# Patient Record
Sex: FEMALE | Race: WHITE | NOT HISPANIC OR LATINO
[De-identification: names, ages, dates, MRNs, and addresses within clinical notes are randomized per-mention and may not be internally consistent; named-entity substitution may affect disease eponyms.]

---

## 2017-09-01 ENCOUNTER — TRANSCRIPTION ENCOUNTER (OUTPATIENT)
Age: 62
End: 2017-09-01

## 2017-09-05 ENCOUNTER — TRANSCRIPTION ENCOUNTER (OUTPATIENT)
Age: 62
End: 2017-09-05

## 2017-11-02 ENCOUNTER — APPOINTMENT (OUTPATIENT)
Dept: ENDOCRINOLOGY | Facility: CLINIC | Age: 62
End: 2017-11-02
Payer: COMMERCIAL

## 2017-11-02 VITALS — OXYGEN SATURATION: 98 % | HEART RATE: 72 BPM | DIASTOLIC BLOOD PRESSURE: 82 MMHG | SYSTOLIC BLOOD PRESSURE: 104 MMHG

## 2017-11-02 VITALS — BODY MASS INDEX: 23.97 KG/M2 | HEIGHT: 63.25 IN | WEIGHT: 137 LBS

## 2017-11-02 DIAGNOSIS — Z87.39 PERSONAL HISTORY OF OTHER DISEASES OF THE MUSCULOSKELETAL SYSTEM AND CONNECTIVE TISSUE: ICD-10-CM

## 2017-11-02 PROCEDURE — ZZZZZ: CPT

## 2017-11-02 PROCEDURE — 77080 DXA BONE DENSITY AXIAL: CPT

## 2017-11-02 PROCEDURE — 99244 OFF/OP CNSLTJ NEW/EST MOD 40: CPT | Mod: 25

## 2017-11-08 RX ORDER — RISEDRONATE SODIUM 150 MG/1
150 TABLET, FILM COATED ORAL
Qty: 3 | Refills: 3 | Status: DISCONTINUED | COMMUNITY
Start: 2017-11-02 | End: 2017-11-08

## 2017-11-21 ENCOUNTER — TRANSCRIPTION ENCOUNTER (OUTPATIENT)
Age: 62
End: 2017-11-21

## 2017-12-06 ENCOUNTER — TRANSCRIPTION ENCOUNTER (OUTPATIENT)
Age: 62
End: 2017-12-06

## 2017-12-06 ENCOUNTER — RX RENEWAL (OUTPATIENT)
Age: 62
End: 2017-12-06

## 2017-12-07 ENCOUNTER — TRANSCRIPTION ENCOUNTER (OUTPATIENT)
Age: 62
End: 2017-12-07

## 2017-12-09 LAB
25(OH)D3 SERPL-MCNC: 49.1 NG/ML
ALBUMIN SERPL ELPH-MCNC: 4.8 G/DL
ALP BLD-CCNC: 75 U/L
ALT SERPL-CCNC: 20 U/L
ANION GAP SERPL CALC-SCNC: 15 MMOL/L
AST SERPL-CCNC: 21 U/L
BILIRUB SERPL-MCNC: 0.2 MG/DL
BUN SERPL-MCNC: 15 MG/DL
CALCIUM SERPL-MCNC: 10.2 MG/DL
CALCIUM SERPL-MCNC: 10.2 MG/DL
CHLORIDE SERPL-SCNC: 101 MMOL/L
CO2 SERPL-SCNC: 27 MMOL/L
CREAT SERPL-MCNC: 0.79 MG/DL
GLUCOSE SERPL-MCNC: 88 MG/DL
PARATHYROID HORMONE INTACT: 21 PG/ML
POTASSIUM SERPL-SCNC: 4.1 MMOL/L
PROT SERPL-MCNC: 7.3 G/DL
SODIUM SERPL-SCNC: 143 MMOL/L
TSH SERPL-ACNC: 2.3 UIU/ML

## 2018-05-14 ENCOUNTER — APPOINTMENT (OUTPATIENT)
Dept: ENDOCRINOLOGY | Facility: CLINIC | Age: 63
End: 2018-05-14
Payer: COMMERCIAL

## 2018-05-14 VITALS
HEIGHT: 63.25 IN | WEIGHT: 138 LBS | DIASTOLIC BLOOD PRESSURE: 80 MMHG | HEART RATE: 75 BPM | SYSTOLIC BLOOD PRESSURE: 116 MMHG | BODY MASS INDEX: 24.15 KG/M2 | OXYGEN SATURATION: 98 %

## 2018-05-14 PROCEDURE — 99214 OFFICE O/P EST MOD 30 MIN: CPT

## 2018-05-14 RX ORDER — ALENDRONATE SODIUM 70 MG/1
70 TABLET ORAL
Qty: 13 | Refills: 3 | Status: DISCONTINUED | COMMUNITY
Start: 2017-11-08 | End: 2018-05-14

## 2018-06-25 ENCOUNTER — TRANSCRIPTION ENCOUNTER (OUTPATIENT)
Age: 63
End: 2018-06-25

## 2018-06-26 ENCOUNTER — RESULT REVIEW (OUTPATIENT)
Age: 63
End: 2018-06-26

## 2018-06-26 ENCOUNTER — TRANSCRIPTION ENCOUNTER (OUTPATIENT)
Age: 63
End: 2018-06-26

## 2018-06-28 ENCOUNTER — TRANSCRIPTION ENCOUNTER (OUTPATIENT)
Age: 63
End: 2018-06-28

## 2018-07-26 ENCOUNTER — TRANSCRIPTION ENCOUNTER (OUTPATIENT)
Age: 63
End: 2018-07-26

## 2018-08-18 ENCOUNTER — APPOINTMENT (OUTPATIENT)
Dept: RHEUMATOLOGY | Facility: CLINIC | Age: 63
End: 2018-08-18
Payer: COMMERCIAL

## 2018-08-18 PROCEDURE — 96374 THER/PROPH/DIAG INJ IV PUSH: CPT

## 2018-11-28 ENCOUNTER — APPOINTMENT (OUTPATIENT)
Dept: ENDOCRINOLOGY | Facility: CLINIC | Age: 63
End: 2018-11-28
Payer: COMMERCIAL

## 2018-11-28 VITALS
HEIGHT: 63.25 IN | WEIGHT: 120 LBS | DIASTOLIC BLOOD PRESSURE: 70 MMHG | SYSTOLIC BLOOD PRESSURE: 104 MMHG | HEART RATE: 70 BPM | BODY MASS INDEX: 21 KG/M2 | OXYGEN SATURATION: 98 %

## 2018-11-28 PROCEDURE — 99214 OFFICE O/P EST MOD 30 MIN: CPT | Mod: 25

## 2018-11-28 PROCEDURE — 77080 DXA BONE DENSITY AXIAL: CPT

## 2018-11-28 PROCEDURE — ZZZZZ: CPT

## 2018-11-28 RX ORDER — RISEDRONATE SODIUM 150 MG/1
150 TABLET, FILM COATED ORAL
Qty: 3 | Refills: 3 | Status: DISCONTINUED | COMMUNITY
Start: 2017-12-06 | End: 2018-11-28

## 2018-11-28 RX ORDER — LOSARTAN POTASSIUM 25 MG/1
25 TABLET, FILM COATED ORAL
Refills: 0 | Status: ACTIVE | COMMUNITY

## 2018-11-29 LAB
25(OH)D3 SERPL-MCNC: 47.2 NG/ML
ALBUMIN SERPL ELPH-MCNC: 4.6 G/DL
ALP BLD-CCNC: 55 U/L
ALT SERPL-CCNC: 19 U/L
ANION GAP SERPL CALC-SCNC: 12 MMOL/L
AST SERPL-CCNC: 26 U/L
BILIRUB SERPL-MCNC: 0.4 MG/DL
BUN SERPL-MCNC: 14 MG/DL
CALCIUM SERPL-MCNC: 10.2 MG/DL
CHLORIDE SERPL-SCNC: 101 MMOL/L
CHOLEST SERPL-MCNC: 174 MG/DL
CHOLEST/HDLC SERPL: 2.6 RATIO
CO2 SERPL-SCNC: 28 MMOL/L
CREAT SERPL-MCNC: 1.04 MG/DL
GLUCOSE SERPL-MCNC: 83 MG/DL
HDLC SERPL-MCNC: 68 MG/DL
LDLC SERPL CALC-MCNC: 84 MG/DL
POTASSIUM SERPL-SCNC: 4.7 MMOL/L
PROT SERPL-MCNC: 7.5 G/DL
SODIUM SERPL-SCNC: 141 MMOL/L
TRIGL SERPL-MCNC: 108 MG/DL
TSH SERPL-ACNC: 2.71 UIU/ML

## 2018-11-29 RX ORDER — ALBUTEROL SULFATE 90 UG/1
108 (90 BASE) AEROSOL, METERED RESPIRATORY (INHALATION)
Qty: 9 | Refills: 0 | Status: COMPLETED | COMMUNITY
Start: 2018-08-20

## 2018-12-03 LAB — COLLAGEN CTX SERPL-MCNC: 96 PG/ML

## 2019-10-20 ENCOUNTER — TRANSCRIPTION ENCOUNTER (OUTPATIENT)
Age: 64
End: 2019-10-20

## 2020-01-14 ENCOUNTER — APPOINTMENT (OUTPATIENT)
Dept: ENDOCRINOLOGY | Facility: CLINIC | Age: 65
End: 2020-01-14
Payer: COMMERCIAL

## 2020-01-14 VITALS
WEIGHT: 118 LBS | SYSTOLIC BLOOD PRESSURE: 110 MMHG | HEIGHT: 63 IN | HEART RATE: 65 BPM | DIASTOLIC BLOOD PRESSURE: 64 MMHG | BODY MASS INDEX: 20.91 KG/M2 | OXYGEN SATURATION: 98 %

## 2020-01-14 PROCEDURE — 77080 DXA BONE DENSITY AXIAL: CPT

## 2020-01-14 PROCEDURE — 99214 OFFICE O/P EST MOD 30 MIN: CPT | Mod: 25

## 2020-01-14 PROCEDURE — ZZZZZ: CPT

## 2020-01-14 NOTE — PROCEDURE
[FreeTextEntry1] : Bone mineral density: 01/14/2020 \par Indication: vs 2018.\par Spine: (L1 - L2) -2.2, osteopenia, -3.3%\par Total hip: -2.5, osteoporosis, -5.8%\par Femoral neck: -2.9, osteoporosis, no significant change \par Proximal radius: -1.5, osteopenia, -7.5%\par \par Bone mineral density: 11/28/2018 \par Indication: vs. 2017 prior test showed bone loss, assess response to medication \par Spine: (L1-L2) -2.0 osteopenia, no significant change \par Total hip: -2.2 osteopenia, no significant change \par Femoral neck: -2.9 osteoporosis (-6.1%, but troch stable)\par Proximal radius: -0.6 normal, no significant change \par \par Bone mineral density November 4, 2017\par indication:Compared to 2013\par spine L1-2 -2.0 osteopenia -3.3% versus 2013 -10.3% versus 2010\par total hip -2.2, osteopenia, -4.0%, -10.9% versus 2010\par femoral neck - 2.6, osteoporosis, -5.1%; -8.6% versus 2010\par proximal radius -1.2   osteopenia \par \par bone mineral density test performed December 26, 2013\par Spine L1-L2 -1.8, osteopenia -7.2% versus 2010\par total hip -2.0, osteopenia, -7.3% versus 2010\par Femoral neck -2.3, osteopenia, no significant change versus 2010\par proximal radius -1.0, normal

## 2020-01-14 NOTE — HISTORY OF PRESENT ILLNESS
[Risedronate (Actonel)] : Risedronate [Calcium (dietary)] : calcium from their regular diet [Vitamin D (supplements)] : Vitamin D as a dietary supplement [Patient taking Meds Correctly] : Patient is taking meds correctly [Zoledronic Acid (Zometa)] : Zoledronic Acid [Family History of Osteoporosis] : family history of osteoporosis [Regular Dental Follow-Up] : regular dental follow-up [Previous Fragility Fracture] : previous fragility fracture(s) [FreeTextEntry1] : F/u for 64 year-old female with osteoporosis.\par \par H/o traumatic tibial plateau fx; wrist fx, fell over dog. Pt had low bone density after menopause consistent with osteopenia which decreased from 2010 to 2013 but she chose to observe off therapy. BMD 2017 showed significant decrease in the spine and hip making the fem neck consistent with osteoporosis. Began Actonel 11/2017 pt had some mild UGI sx. Switched to IV Reclast, first dose ~July 2018. Tolerated well, except for minor breast rash for 3 days. No interval fx. Last DDS within 6 mons. No ONJ. No thigh pain. \par \par FHx is notable for idiopathic osteoporosis in her son. Her daughter dx with papillary thyroid CA. [Disordered Eating] : no past or present history of disordered eating [Amenorrhea] : no past or present history of amenorrhea [Taking Steroids] : no past or present history of taking steroids [Kidney Stones] : no history of kidney stones [Family History of Breast Cancer] : no family history of breast cancer [Hyperparathyroidism] : no hyperparathyroidism [Family History of Hip Fracture] : no family history of hip fracture [History of Radiation Therapy] : no history of radiation therapy [History of Blood Clots] : no history of blood clots

## 2020-01-14 NOTE — PHYSICAL EXAM
[Alert] : alert [Well Developed] : well developed [No Acute Distress] : no acute distress [Well Nourished] : well nourished [Normal Sclera/Conjunctiva] : normal sclera/conjunctiva [No Proptosis] : no proptosis [Normal Oropharynx] : the oropharynx was normal [Thyroid Not Enlarged] : the thyroid was not enlarged [No Thyroid Nodules] : there were no palpable thyroid nodules [No Respiratory Distress] : no respiratory distress [No Accessory Muscle Use] : no accessory muscle use [Clear to Auscultation] : lungs were clear to auscultation bilaterally [Normal Rate] : heart rate was normal  [Normal S1, S2] : normal S1 and S2 [Regular Rhythm] : with a regular rhythm [Normal Bowel Sounds] : normal bowel sounds [Not Tender] : non-tender [Soft] : abdomen soft [Anterior Cervical Nodes] : anterior cervical nodes [Not Distended] : not distended [Normal] : normal and non tender [No Spinal Tenderness] : no spinal tenderness [Spine Straight] : spine straight [Normal Gait] : normal gait [No Stigmata of Cushings Syndrome] : no stigmata of cushings syndrome [No Rash] : no rash [Normal Strength/Tone] : muscle strength and tone were normal [Normal Reflexes] : deep tendon reflexes were 2+ and symmetric [No Tremors] : no tremors [Oriented x3] : oriented to person, place, and time

## 2020-01-14 NOTE — END OF VISIT
[FreeTextEntry3] : I, Everton Espino, authored this note working as a medical scribe for Dr. Patiño.  01/14/2020.  4:30PM.  This note was authored by the medical scribe for me. I have reviewed, edited, and revised the note as needed. I am in agreement with the exam findings, imaging findings, and treatment plan.  Raz Patiño MD

## 2020-01-14 NOTE — ASSESSMENT
[Bisphosphonate Therapy] : Risks  and benefits of bisphosphonate therapy were  discussed with the patient including gastroesophageal irritation, osteonecrosis of the jaw, and atypical femur fractures, and acute phase reaction [FreeTextEntry1] : 64 year-old female with postmenopausal osteoporosis. \par \par Pt has previously suffered traumatic R wrist and tibia fx. Pt had decreasing BMD from 2010 to 2013 only c/w osteopenia, but then further decrease in 2017 making the fem neck c/w osteoporosis. Pt was advised that she is at increased risk for future fx due to osteoporosis. Pt began Actonel in 2017. Took correctly, pt had mild UGI sx on Actonel. No interval fx. Switched to IV Reclast ~July 2018. She tolerated well, except for minor localized rash on her L breast for only 3 days. BMD 11/2018 indicates decrease in the fem neck but stability in all other sites. BMD 1/2020 show sl decrease in all areas except fem neck. Tot hip and fem neck c/w osteoporosis.\par \par Options of therapy were reviewed in detail. Major choice is try another dose of IV Reclast or switch to Prolia to stop further bone loss. Risks and benefits discussed. All questions were answered. Pt understands and agrees to restart treatment with Prolia.\par \par F/u in 3 weeks for the first dose of Prolia. [Denosumab Therapy] : Risks  and benefits of denosumab therapy were discussed with the patient including eczema, cellulitis, osteonecrosis of the jaw and atypical femur fractures

## 2020-01-31 ENCOUNTER — APPOINTMENT (OUTPATIENT)
Dept: ENDOCRINOLOGY | Facility: CLINIC | Age: 65
End: 2020-01-31
Payer: COMMERCIAL

## 2020-01-31 PROCEDURE — 96401 CHEMO ANTI-NEOPL SQ/IM: CPT

## 2020-01-31 RX ORDER — DENOSUMAB 60 MG/ML
60 INJECTION SUBCUTANEOUS
Qty: 1 | Refills: 0 | Status: COMPLETED | OUTPATIENT
Start: 2020-01-23

## 2020-07-08 ENCOUNTER — TRANSCRIPTION ENCOUNTER (OUTPATIENT)
Age: 65
End: 2020-07-08

## 2020-07-27 ENCOUNTER — TRANSCRIPTION ENCOUNTER (OUTPATIENT)
Age: 65
End: 2020-07-27

## 2020-07-29 ENCOUNTER — TRANSCRIPTION ENCOUNTER (OUTPATIENT)
Age: 65
End: 2020-07-29

## 2020-08-04 ENCOUNTER — TRANSCRIPTION ENCOUNTER (OUTPATIENT)
Age: 65
End: 2020-08-04

## 2020-08-07 ENCOUNTER — APPOINTMENT (OUTPATIENT)
Dept: ENDOCRINOLOGY | Facility: CLINIC | Age: 65
End: 2020-08-07
Payer: COMMERCIAL

## 2020-08-07 VITALS
HEIGHT: 63 IN | OXYGEN SATURATION: 98 % | BODY MASS INDEX: 21.09 KG/M2 | DIASTOLIC BLOOD PRESSURE: 80 MMHG | TEMPERATURE: 98.1 F | HEART RATE: 68 BPM | WEIGHT: 119 LBS | SYSTOLIC BLOOD PRESSURE: 120 MMHG

## 2020-08-07 DIAGNOSIS — I10 ESSENTIAL (PRIMARY) HYPERTENSION: ICD-10-CM

## 2020-08-07 PROCEDURE — 99213 OFFICE O/P EST LOW 20 MIN: CPT

## 2020-08-07 NOTE — HISTORY OF PRESENT ILLNESS
[Risedronate (Actonel)] : Risedronate [Calcium (dietary)] : calcium from their regular diet [Vitamin D (supplements)] : Vitamin D as a dietary supplement [Zoledronic Acid (Zometa)] : Zoledronic Acid [Patient taking Meds Correctly] : Patient is taking meds correctly [Family History of Osteoporosis] : family history of osteoporosis [Regular Dental Follow-Up] : regular dental follow-up [Previous Fragility Fracture] : previous fragility fracture(s) [FreeTextEntry1] :  \par \par H/o traumatic tibial plateau fx; wrist fx, fell over dog. Pt had low bone density after menopause consistent with osteopenia which decreased from 2010 to 2013 but she chose to observe off therapy. BMD 2017 showed significant decrease in the spine and hip making the fem neck consistent with osteoporosis. Began Actonel 11/2017 pt had some mild UGI sx. Switched to IV Reclast, first dose ~July 2018. Tolerated well, except for minor breast rash for 3 days. No interval fx. Last DDS within 6 mons. No ONJ. No thigh pain. \par Switched to Prolia 1/2020 c/o eczema like rash ear. c/o migrating itch, various spots on body. \par FHx is notable for idiopathic osteoporosis in her son. Her daughter dx with papillary thyroid CA. [Amenorrhea] : no past or present history of amenorrhea [Disordered Eating] : no past or present history of disordered eating [Kidney Stones] : no history of kidney stones [Taking Steroids] : no past or present history of taking steroids [Family History of Breast Cancer] : no family history of breast cancer [Family History of Hip Fracture] : no family history of hip fracture [Hyperparathyroidism] : no hyperparathyroidism [History of Radiation Therapy] : no history of radiation therapy [History of Blood Clots] : no history of blood clots

## 2020-08-07 NOTE — PHYSICAL EXAM
[Alert] : alert [Well Nourished] : well nourished [No Acute Distress] : no acute distress [Well Developed] : well developed [EOMI] : extra ocular movement intact [Normal Sclera/Conjunctiva] : normal sclera/conjunctiva [No Proptosis] : no proptosis [Normal Oropharynx] : the oropharynx was normal [Thyroid Not Enlarged] : the thyroid was not enlarged [No Thyroid Nodules] : no palpable thyroid nodules [No Accessory Muscle Use] : no accessory muscle use [Clear to Auscultation] : lungs were clear to auscultation bilaterally [No Respiratory Distress] : no respiratory distress [Regular Rhythm] : with a regular rhythm [Normal S1, S2] : normal S1 and S2 [Normal Rate] : heart rate was normal [No Edema] : no peripheral edema [Pedal Pulses Normal] : the pedal pulses are present [Not Tender] : non-tender [Normal Bowel Sounds] : normal bowel sounds [Not Distended] : not distended [Normal Anterior Cervical Nodes] : no anterior cervical lymphadenopathy [Soft] : abdomen soft [Normal Posterior Cervical Nodes] : no posterior cervical lymphadenopathy [No Stigmata of Cushings Syndrome] : no stigmata of Cushings Syndrome [No Spinal Tenderness] : no spinal tenderness [Spine Straight] : spine straight [Normal Strength/Tone] : muscle strength and tone were normal [No Rash] : no rash [Normal Gait] : normal gait [Acanthosis Nigricans] : no acanthosis nigricans [Normal Reflexes] : deep tendon reflexes were 2+ and symmetric [No Tremors] : no tremors [Oriented x3] : oriented to person, place, and time

## 2020-08-07 NOTE — ASSESSMENT
[Raloxifene Therapy] : Risks and benefits of Raloxifene therapy were discussed with the patient including blood clots, hot flashes, and cramps [FreeTextEntry1] : 65 year-old female with postmenopausal osteoporosis. \par \par Pt has previously suffered traumatic R wrist and tibia fx. Pt had decreasing BMD from 2010 to 2013 only c/w osteopenia, but then further decrease in 2017 making the fem neck c/w osteoporosis. Pt was advised that she is at increased risk for future fx due to osteoporosis. Pt began Actonel in 2017. Took correctly, pt had mild UGI sx on Actonel. No interval fx. Switched to IV Reclast ~July 2018. She tolerated well, except for minor localized rash on her L breast for only 3 days. BMD 11/2018 indicates decrease in the fem neck but stability in all other sites. BMD 1/2020 show sl decrease in all areas except fem neck. Tot hip and fem neck c/w osteoporosis.\par Patient did not tolerate Prolia due to nonspecific pruritus.  Options of therapy discussed in great detail.\par Recommend beginning raloxifene and then reevaluating choices in future.  Previously tolerated tamoxifen well.  No history of deep vein thrombosis\par Hypertension: Blood pressure well controlled on current medication.

## 2020-08-18 ENCOUNTER — TRANSCRIPTION ENCOUNTER (OUTPATIENT)
Age: 65
End: 2020-08-18

## 2020-08-25 ENCOUNTER — TRANSCRIPTION ENCOUNTER (OUTPATIENT)
Age: 65
End: 2020-08-25

## 2021-02-19 ENCOUNTER — APPOINTMENT (OUTPATIENT)
Dept: ENDOCRINOLOGY | Facility: CLINIC | Age: 66
End: 2021-02-19
Payer: COMMERCIAL

## 2021-02-19 VITALS
SYSTOLIC BLOOD PRESSURE: 120 MMHG | DIASTOLIC BLOOD PRESSURE: 70 MMHG | OXYGEN SATURATION: 98 % | TEMPERATURE: 98.4 F | WEIGHT: 118 LBS | HEART RATE: 79 BPM | BODY MASS INDEX: 20.91 KG/M2 | HEIGHT: 63.1 IN

## 2021-02-19 PROCEDURE — 99214 OFFICE O/P EST MOD 30 MIN: CPT | Mod: 25

## 2021-02-19 PROCEDURE — 77080 DXA BONE DENSITY AXIAL: CPT

## 2021-02-19 RX ORDER — ZOLEDRONIC ACID 5 MG/100ML
5 INJECTION, SOLUTION INTRAVENOUS
Qty: 1 | Refills: 0 | Status: DISCONTINUED | COMMUNITY
Start: 2018-06-26 | End: 2021-02-19

## 2021-02-19 NOTE — PHYSICAL EXAM
[Alert] : alert [Well Nourished] : well nourished [No Acute Distress] : no acute distress [Well Developed] : well developed [Normal Sclera/Conjunctiva] : normal sclera/conjunctiva [EOMI] : extra ocular movement intact [No Proptosis] : no proptosis [Thyroid Not Enlarged] : the thyroid was not enlarged [No Thyroid Nodules] : no palpable thyroid nodules [Clear to Auscultation] : lungs were clear to auscultation bilaterally [Normal S1, S2] : normal S1 and S2 [Normal Rate] : heart rate was normal [Regular Rhythm] : with a regular rhythm [No Edema] : no peripheral edema [Normal Bowel Sounds] : normal bowel sounds [Not Tender] : non-tender [Not Distended] : not distended [Soft] : abdomen soft [Normal Anterior Cervical Nodes] : no anterior cervical lymphadenopathy [No Spinal Tenderness] : no spinal tenderness [No Stigmata of Cushings Syndrome] : no stigmata of Cushings Syndrome [Spine Straight] : spine straight [Normal Gait] : normal gait [Normal Reflexes] : deep tendon reflexes were 2+ and symmetric [No Tremors] : no tremors [Oriented x3] : oriented to person, place, and time

## 2021-02-19 NOTE — ASSESSMENT
[Raloxifene Therapy] : Risks and benefits of Raloxifene therapy were discussed with the patient including blood clots, hot flashes, and cramps [FreeTextEntry1] : 65 year-old female with postmenopausal osteoporosis. \par \par Pt has previously suffered traumatic R wrist and tibia fx. Pt had decreasing BMD from 2010 to 2013 only c/w osteopenia, but then further decrease in 2017 making the fem neck c/w osteoporosis. Pt was advised that she is at increased risk for future fx due to osteoporosis. Pt began Actonel in 2017. Took correctly, pt had mild UGI sx on Actonel. No interval fx. Switched to IV Reclast ~July 2018. She tolerated well, except for minor localized rash on her L breast for only 3 days. BMD 11/2018 indicates decrease in the fem neck but stability in all other sites. BMD 1/2020 show sl decrease in all areas except fem neck. Tot hip and fem neck c/w osteoporosis. Switched to Prolia 1/2020. Did not tolerate, had nonspecific pruritus. \par \par Options of therapy reviewed. Pt transitioned to Evista 8/2020 to rx osteoporosis and primary prevention of breast CA. Taking correctly, tolerating well. No hot flashes, no leg cramps, no DVT. No interval fx. BMD 2/2021 indicates stable osteopenia in spine, significantly improving osteopenia in total hip, improving osteoporosis in femoral neck, and normal improving proximal radius. BMD results reviewed w/ pt.\par \par F/u in 1 year w/ BMD

## 2021-02-19 NOTE — PROCEDURE
[FreeTextEntry1] : Bone mineral density: 02/19/2021 \par Indication: vs. 2020 assess response to medication\par Spine: (L1-L2) -2.3 osteopenia, no significant change\par Total hip: -2.2 osteopenia, +7.2%\par Femoral neck: -2.6 osteoporosis, +5.0%\par Proximal radius: -0.9 normal, +5.8%\par \par Bone mineral density: 01/14/2020 \par Indication: vs 2018.\par Spine: (L1 - L2) -2.2, osteopenia, -3.3%\par Total hip: -2.5, osteoporosis, -5.8%\par Femoral neck: -2.9, osteoporosis, no significant change \par Proximal radius: -1.5, osteopenia, -7.5%\par \par Bone mineral density: 11/28/2018 \par Indication: vs. 2017 prior test showed bone loss, assess response to medication \par Spine: (L1-L2) -2.0 osteopenia, no significant change \par Total hip: -2.2 osteopenia, no significant change \par Femoral neck: -2.9 osteoporosis (-6.1%, but troch stable)\par Proximal radius: -0.6 normal, no significant change \par \par Bone mineral density November 4, 2017\par indication:Compared to 2013\par spine L1-2 -2.0 osteopenia -3.3% versus 2013 -10.3% versus 2010\par total hip -2.2, osteopenia, -4.0%, -10.9% versus 2010\par femoral neck - 2.6, osteoporosis, -5.1%; -8.6% versus 2010\par proximal radius -1.2   osteopenia \par \par bone mineral density test performed December 26, 2013\par Spine L1-L2 -1.8, osteopenia -7.2% versus 2010\par total hip -2.0, osteopenia, -7.3% versus 2010\par Femoral neck -2.3, osteopenia, no significant change versus 2010\par proximal radius -1.0, normal

## 2021-02-19 NOTE — HISTORY OF PRESENT ILLNESS
[Risedronate (Actonel)] : Risedronate [Zoledronic Acid (Zometa)] : Zoledronic Acid [Calcium (dietary)] : calcium from their regular diet [Vitamin D (supplements)] : Vitamin D as a dietary supplement [Family History of Osteoporosis] : family history of osteoporosis [Regular Dental Follow-Up] : regular dental follow-up [Previous Fragility Fracture] : previous fragility fracture(s) [Prolia (Denosumab)] : Prolia (Denosumab) [Raloxifene (Evista)] : Raloxifene [Patient taking Meds Correctly] : Patient is taking meds correctly [FreeTextEntry1] : No significant health change. No interval surgery's, fractures, health change, or change in medications. \par \par H/o traumatic tibial plateau fx; wrist fx, fell over dog. Pt had low bone density after menopause consistent with osteopenia which decreased from 2010 to 2013 but she chose to observe off therapy. BMD 2017 showed significant decrease in the spine and hip making the fem neck consistent with osteoporosis. Began Actonel 11/2017 pt had some mild UGI sx. Switched to IV Reclast, first dose ~July 2018. Tolerated well, except for minor breast rash for 3 days. No interval fx. No ONJ. No thigh pain.\par \par Switched to Prolia 1/2020. Did not tolerate, had nonspecific pruritus. Pt transitioned to Evista 8/2020. Taking correctly, tolerating well. No hot flashes, no leg cramps, no DVT. No interval fx.\par \par FHx is notable for idiopathic osteoporosis in her son. Her daughter dx with papillary thyroid CA. [Amenorrhea] : no past or present history of amenorrhea [Disordered Eating] : no past or present history of disordered eating [Taking Steroids] : no past or present history of taking steroids [Kidney Stones] : no history of kidney stones [Family History of Breast Cancer] : no family history of breast cancer [Family History of Hip Fracture] : no family history of hip fracture [Hyperparathyroidism] : no hyperparathyroidism [History of Radiation Therapy] : no history of radiation therapy [History of Blood Clots] : no history of blood clots

## 2022-02-22 ENCOUNTER — APPOINTMENT (OUTPATIENT)
Dept: ENDOCRINOLOGY | Facility: CLINIC | Age: 67
End: 2022-02-22
Payer: COMMERCIAL

## 2022-02-22 ENCOUNTER — RX RENEWAL (OUTPATIENT)
Age: 67
End: 2022-02-22

## 2022-02-22 VITALS
DIASTOLIC BLOOD PRESSURE: 62 MMHG | WEIGHT: 121 LBS | OXYGEN SATURATION: 99 % | SYSTOLIC BLOOD PRESSURE: 188 MMHG | TEMPERATURE: 98.2 F | HEART RATE: 80 BPM | BODY MASS INDEX: 21.44 KG/M2 | HEIGHT: 63 IN

## 2022-02-22 PROCEDURE — 99213 OFFICE O/P EST LOW 20 MIN: CPT | Mod: 25

## 2022-02-22 PROCEDURE — 77080 DXA BONE DENSITY AXIAL: CPT

## 2022-02-22 PROCEDURE — ZZZZZ: CPT

## 2022-02-23 NOTE — HISTORY OF PRESENT ILLNESS
[Risedronate (Actonel)] : Risedronate [Zoledronic Acid (Zometa)] : Zoledronic Acid [Denosumab (Prolia)] : Denosumab [Raloxifene (Evista)] : Raloxifene [FreeTextEntry1] : No significant interval health changes. No interval hospitalizations, fractures, or change in medications.\par \par H/o traumatic tibial plateau fx; wrist fx, fell over dog. Pt had low bone density after menopause consistent with osteopenia which decreased from 2010 to 2013 but she chose to observe off therapy. BMD 11/2017 showed significant decrease in the spine and hip making the fem neck consistent with osteoporosis. Began Actonel 11/2017 pt had some mild UGI sx. Switched to IV Reclast, first dose ~7/2018. Tolerated well, except for minor breast rash for 3 days. BMD 11/2018 indicates decrease in the fem neck but stability in all other sites. Switched to Prolia 1/2020. Did not tolerate, had nonspecific pruritus. Pt transitioned to Evista 8/2020. Taking correctly, tolerating well. Occasional hot flashes, no leg cramps, no DVT. No interval fx. Up to date with mammography and GYN. BMD 2/2021 indicates stable osteopenia in spine, significantly improving osteopenia in total hip, improving osteoporosis in femoral neck, and normal improving proximal radius.\par \par FHx is notable for idiopathic osteoporosis in her son. Her daughter dx with papillary thyroid CA.\par \par Successful ankle bone spur removal and left bunionectomy 6/2021, had left metatarsal fx post op, no surgical repair.

## 2022-02-23 NOTE — ASSESSMENT
[Raloxifene Therapy] : Risks and benefits of Raloxifene therapy were discussed with the patient including blood clots, hot flashes, and cramps [FreeTextEntry1] : 66 year-old female with postmenopausal osteoporosis. \par \par Pt has previously suffered traumatic R wrist and tibia fx. Pt had decreasing BMD from 2010 to 2013 only c/w osteopenia, but then further decrease in BMD 11/2017 making the fem neck c/w osteoporosis. Pt was advised that she is at increased risk for future fx due to osteoporosis. Pt began Actonel in 2017. Took correctly, pt had mild UGI sx on Actonel. No interval fx. Switched to IV Reclast ~7/2018. She tolerated well, except for minor localized rash on her L breast for only 3 days. BMD 11/2018 indicates decrease in the fem neck but stability in all other sites. BMD 1/2020 show sl decrease in all areas except fem neck. Tot hip and fem neck c/w osteoporosis. Switched to Prolia 1/2020. Did not tolerate, had nonspecific pruritus. Pt transitioned to Evista 8/2020 to rx osteoporosis and primary prevention of breast CA. Taking correctly, tolerating well. Occasional hot flashes, no leg cramps, no DVT. No interval fx. BMD 2/2021 indicates stable osteopenia in spine, significantly improving osteopenia in total hip, improving osteoporosis in femoral neck, and normal improving proximal radius.\par  BMD 2/2022 indicates worsened now osteoporosis in spine and total hip, stable osteoporosis in femoral neck, and stable osteopenia in proximal radius, essentially normal. BMD results reviewed w/ pt. As her lowest site, the femoral neck, is stable , I recommend pt c/w Evista, reevaluate options in 1 year.\par \par F/u in 1 year w/ BMD

## 2022-02-23 NOTE — PROCEDURE
[FreeTextEntry1] : Bone mineral density: 02/22/2022 \par Indication: vs. 2021\par Spine: (L1-L2) -2.5 osteoporosis, -3.2%\par Total hip: -2.5 osteoporosis, -6.6%\par Femoral neck: -2.7 osteoporosis, no significant change\par Proximal radius: -1.1 osteopenia, no significant change\par \par Bone mineral density: 02/19/2021 \par Indication: vs. 2020 assess response to medication\par Spine: (L1-L2) -2.3 osteopenia, no significant change\par Total hip: -2.2 osteopenia, +7.2%\par Femoral neck: -2.6 osteoporosis, +5.0%\par Proximal radius: -0.9 normal, +5.8%\par \par Bone mineral density: 01/14/2020 \par Indication: vs 2018.\par Spine: (L1 - L2) -2.2, osteopenia, -3.3%\par Total hip: -2.5, osteoporosis, -5.8%\par Femoral neck: -2.9, osteoporosis, no significant change \par Proximal radius: -1.5, osteopenia, -7.5%\par \par Bone mineral density: 11/28/2018 \par Indication: vs. 2017 prior test showed bone loss, assess response to medication \par Spine: (L1-L2) -2.0 osteopenia, no significant change \par Total hip: -2.2 osteopenia, no significant change \par Femoral neck: -2.9 osteoporosis (-6.1%, but troch stable)\par Proximal radius: -0.6 normal, no significant change \par \par Bone mineral density November 4, 2017\par indication:Compared to 2013\par spine L1-2 -2.0 osteopenia -3.3% versus 2013 -10.3% versus 2010\par total hip -2.2, osteopenia, -4.0%, -10.9% versus 2010\par femoral neck - 2.6, osteoporosis, -5.1%; -8.6% versus 2010\par proximal radius -1.2   osteopenia \par \par bone mineral density test performed December 26, 2013\par Spine L1-L2 -1.8, osteopenia -7.2% versus 2010\par total hip -2.0, osteopenia, -7.3% versus 2010\par Femoral neck -2.3, osteopenia, no significant change versus 2010\par proximal radius -1.0, normal

## 2022-02-23 NOTE — END OF VISIT
[FreeTextEntry3] : I, Jae Soler, authored this note working as a medical scribe for Dr. Patiño.  02/22/2022.  9:45AM. This note was authored by the medical scribe for me. I have reviewed, edited, and revised the note as needed. I am in agreement with the exam findings, imaging findings, and treatment plan.  Raz Patiño MD

## 2023-01-27 ENCOUNTER — RX RENEWAL (OUTPATIENT)
Age: 68
End: 2023-01-27

## 2023-02-12 NOTE — END OF VISIT
Pt had a vaginal delivery 4 weeks ago.   She states this past week she has had to strain to have BMs.    Advised to increase water intake, increase dietary fiber, limit dairy products to 3 a day and take a daily stool softener.    Pt states she has had occasional vaginal bleeding.  Denies any vaginal itching, burning or foul smelling discharge.  Denies urinary symptoms.  Suggested that it can be common to still notice some spotting at 4 weeks post partum.    Pt is also concerned about something she saw in the opening of her vagina when she looked at it in the mirror.  She looked it up on the internet and is wondering if she could have a prolapse.  She denies any pain or discomfort.  Suggested that it could be some swelling from her recent vaginal delivery but suggested that she have Dr. Ruiz further assess.    Pt is scheduled for a post partum visit on 8/27 and will have him look at it at that time.  This should be fine to wait a week since it is not causing her pain or bleeding.    Chelle Chang RN     [FreeTextEntry3] : I, Jae Soler, authored this note working as a medical scribe for Dr. Patiño.  02/19/2021. 10:15AM. This note was authored by the medical scribe for me. I have reviewed, edited, and revised the note as needed. I am in agreement with the exam findings, imaging findings, and treatment plan.  Raz Patiño MD  - - -

## 2023-03-03 ENCOUNTER — APPOINTMENT (OUTPATIENT)
Dept: ENDOCRINOLOGY | Facility: CLINIC | Age: 68
End: 2023-03-03
Payer: COMMERCIAL

## 2023-03-03 VITALS — HEIGHT: 63 IN | BODY MASS INDEX: 21.26 KG/M2 | WEIGHT: 120 LBS

## 2023-03-03 VITALS — DIASTOLIC BLOOD PRESSURE: 70 MMHG | OXYGEN SATURATION: 97 % | SYSTOLIC BLOOD PRESSURE: 100 MMHG | HEART RATE: 80 BPM

## 2023-03-03 PROCEDURE — 77080 DXA BONE DENSITY AXIAL: CPT

## 2023-03-03 PROCEDURE — ZZZZZ: CPT

## 2023-03-03 PROCEDURE — 99213 OFFICE O/P EST LOW 20 MIN: CPT | Mod: 25

## 2023-03-03 NOTE — PHYSICAL EXAM
[Alert] : alert [Well Nourished] : well nourished [No Acute Distress] : no acute distress [Well Developed] : well developed [Normal Sclera/Conjunctiva] : normal sclera/conjunctiva [No Proptosis] : no proptosis [Thyroid Not Enlarged] : the thyroid was not enlarged [No Thyroid Nodules] : no palpable thyroid nodules [Clear to Auscultation] : lungs were clear to auscultation bilaterally [Normal S1, S2] : normal S1 and S2 [Normal Rate] : heart rate was normal [Regular Rhythm] : with a regular rhythm [No Edema] : no peripheral edema [Not Tender] : non-tender [Not Distended] : not distended [Soft] : abdomen soft [Normal Anterior Cervical Nodes] : no anterior cervical lymphadenopathy [No Spinal Tenderness] : no spinal tenderness [Spine Straight] : spine straight [No Stigmata of Cushings Syndrome] : no stigmata of Cushings Syndrome [Normal Gait] : normal gait [No Tremors] : no tremors [Oriented x3] : oriented to person, place, and time [Supple] : the neck was supple [No Accessory Muscle Use] : no accessory muscle use [Normal Rate and Effort] : normal respiratory rate and effort [Normal Affect] : the affect was normal

## 2023-03-04 NOTE — ASSESSMENT
[Raloxifene Therapy] : Risks and benefits of Raloxifene therapy were discussed with the patient including blood clots, hot flashes, and cramps [FreeTextEntry1] : 67 year-old female with postmenopausal osteoporosis. \par \par Pt has previously suffered traumatic R wrist and tibia fx. Pt had decreasing BMD from 2010 to 2013 only c/w osteopenia, but then further decrease in BMD 11/2017 making the fem neck c/w osteoporosis. Pt was advised that she is at increased risk for future fx due to osteoporosis. Pt began Actonel in 2017. Took correctly, pt had mild UGI sx on Actonel. No interval fx. Switched to IV Reclast ~7/2018. She tolerated well, except for minor localized rash on her L breast for only 3 days. BMD 11/2018 indicates decrease in the fem neck but stability in all other sites. BMD 1/2020 show sl decrease in all areas except fem neck. Tot hip and fem neck c/w osteoporosis. Switched to Prolia 1/2020. Did not tolerate, had nonspecific pruritus. Pt transitioned to Evista 8/2020 to rx osteoporosis and primary prevention of breast CA. Taking correctly, tolerating well. Occasional hot flashes, no leg cramps, no DVT. No interval fx. BMD 2/2021 indicates stable osteopenia in spine, significantly improving osteopenia in total hip, improving osteoporosis in femoral neck, and normal improving proximal radius.\par  At last visit, BMD 2/2022 indicates worsened now osteoporosis in spine and total hip, stable osteoporosis in femoral neck, and stable osteopenia in proximal radius, essentially normal. Given that her lowest site, the femoral neck, was stable on DEXA, it was recommended that patient c/w Evista, and reevaluate options in 1 year.\par 3/3/2023 BMD: stable in spine and total hip, Femoral neck: -2.9 osteoporosis,   and Proximal radius: -1.4 osteopenia, previously -1.1.\par - Discussed options of changing osteoporosis treatment given slight worsening in femoral neck BMD, patient with previous side effects/intolerance to other osteoporosis medications. As options discussed Atelvia enteric coated once a week with food (should tolerate more than Fosamax and Actonel) vs Evenity (not a candidate for forteo or tymlos given radiation hx) vs continuing evista. At this time BMD not severe enough to push for Evenity. Would recommend patient to start Atelvia. Patient agreeable. Will recommend to continue with evista given patient's breast cancer hx. Side effects of bisphosphates discussed in great detail. \par \par \par Will obtain recent labs from PCP: Irene Carranza MD, [phone] \par \par F/u in 1 year w/ BMD

## 2023-03-04 NOTE — PROCEDURE
[FreeTextEntry1] : Bone mineral density: 03/3/2023 \par Indication: vs. 2022\par Spine: (L1-L2) -2.4, stable\par Total hip: -2.5 osteoporosis, no change\par Femoral neck: -2.9 osteoporosis, no significant change \par Proximal radius: -1.4 osteopenia, no significant change \par \par Bone mineral density: 02/22/2022 \par Indication: vs. 2021\par Spine: (L1-L2) -2.5 osteoporosis, -3.2%\par Total hip: -2.5 osteoporosis, -6.6%\par Femoral neck: -2.7 osteoporosis, no significant change\par Proximal radius: -1.1 osteopenia, no significant change\par \par Bone mineral density: 02/19/2021 \par Indication: vs. 2020 assess response to medication\par Spine: (L1-L2) -2.3 osteopenia, no significant change\par Total hip: -2.2 osteopenia, +7.2%\par Femoral neck: -2.6 osteoporosis, +5.0%\par Proximal radius: -0.9 normal, +5.8%\par \par Bone mineral density: 01/14/2020 \par Indication: vs 2018.\par Spine: (L1 - L2) -2.2, osteopenia, -3.3%\par Total hip: -2.5, osteoporosis, -5.8%\par Femoral neck: -2.9, osteoporosis, no significant change \par Proximal radius: -1.5, osteopenia, -7.5%\par \par Bone mineral density: 11/28/2018 \par Indication: vs. 2017 prior test showed bone loss, assess response to medication \par Spine: (L1-L2) -2.0 osteopenia, no significant change \par Total hip: -2.2 osteopenia, no significant change \par Femoral neck: -2.9 osteoporosis (-6.1%, but troch stable)\par Proximal radius: -0.6 normal, no significant change \par \par Bone mineral density November 4, 2017\par indication:Compared to 2013\par spine L1-2 -2.0 osteopenia -3.3% versus 2013 -10.3% versus 2010\par total hip -2.2, osteopenia, -4.0%, -10.9% versus 2010\par femoral neck - 2.6, osteoporosis, -5.1%; -8.6% versus 2010\par proximal radius -1.2   osteopenia \par \par bone mineral density test performed December 26, 2013\par Spine L1-L2 -1.8, osteopenia -7.2% versus 2010\par total hip -2.0, osteopenia, -7.3% versus 2010\par Femoral neck -2.3, osteopenia, no significant change versus 2010\par proximal radius -1.0, normal

## 2023-03-04 NOTE — HISTORY OF PRESENT ILLNESS
[Risedronate (Actonel)] : Risedronate [Zoledronic Acid (Zometa)] : Zoledronic Acid [Denosumab (Prolia)] : Denosumab [Raloxifene (Evista)] : Raloxifene [FreeTextEntry1] : 67 year old female here for a follow up with osteoporosis. Has hx of left breast cancer (DCIS) 2002 s/p radiation, not currently following oncology. Breast cancer hx in sister as well. \par \par No significant interval health changes. No interval hospitalizations, fractures, or change in medications.\par \par H/o traumatic tibial plateau fx; wrist fx, fell over dog. Pt had low bone density after menopause consistent with osteopenia which decreased from 2010 to 2013 but she chose to observe off therapy. BMD 11/2017 showed significant decrease in the spine and hip making the fem neck consistent with osteoporosis. Began Actonel 11/2017 pt had some mild UGI sx. Switched to IV Reclast, first dose ~7/2018. Tolerated well, except for minor breast rash for 3 days. BMD 11/2018 indicates decrease in the fem neck but stability in all other sites. Switched to Prolia 1/2020. Did not tolerate, had nonspecific pruritus. Pt transitioned to Evista 8/2020. Taking correctly, tolerating well. Occasional hot flashes, no leg cramps, no DVT. No interval fx. Up to date with mammography and GYN. BMD 2/2021 indicates stable osteopenia in spine, significantly improving osteopenia in total hip, improving osteoporosis in femoral neck, and normal improving proximal radius. At last visit, BMD 2/2022 indicates worsened now osteoporosis in spine and total hip, stable osteoporosis in femoral neck, and stable osteopenia in proximal radius, essentially normal. Given that her lowest site, the femoral neck, was stable on DEXA, it was recommended that patient c/w Evista, and reevaluate options in 1 year.\par \par Dentist: goes every 6 months, no upcoming procedures as of now, may need implant in the future, being monitored. No new back pain. No worsening in height loss. Denies recent falls or fractures. Vitamin D 2000 IU daily, takes multivitamin that has calcium. Eats 1-2 servings of calcium via diet, was previously on oral calcium but had elevated serum calcium levels and was told to stop oral calcium supplementation. \par \par FHx is notable for idiopathic osteoporosis in her son. Her daughter dx with papillary thyroid CA.\par \par Successful ankle bone spur removal and left bunionectomy 6/2021, had left metatarsal fx post op, no surgical repair. \par \par Recent blood work done was PCP: \par PCP: Irene Carranza MD\par [phone]

## 2024-03-21 ENCOUNTER — NON-APPOINTMENT (OUTPATIENT)
Age: 69
End: 2024-03-21

## 2024-03-25 ENCOUNTER — APPOINTMENT (OUTPATIENT)
Dept: ENDOCRINOLOGY | Facility: CLINIC | Age: 69
End: 2024-03-25
Payer: MEDICARE

## 2024-03-25 VITALS
BODY MASS INDEX: 21.24 KG/M2 | WEIGHT: 118 LBS | SYSTOLIC BLOOD PRESSURE: 110 MMHG | DIASTOLIC BLOOD PRESSURE: 70 MMHG | HEART RATE: 68 BPM | OXYGEN SATURATION: 98 %

## 2024-03-25 VITALS — WEIGHT: 118 LBS | HEIGHT: 62.5 IN | BODY MASS INDEX: 21.17 KG/M2

## 2024-03-25 DIAGNOSIS — M81.0 AGE-RELATED OSTEOPOROSIS W/OUT CURRENT PATHOLOGICAL FRACTURE: ICD-10-CM

## 2024-03-25 DIAGNOSIS — Z91.89 OTHER SPECIFIED PERSONAL RISK FACTORS, NOT ELSEWHERE CLASSIFIED: ICD-10-CM

## 2024-03-25 PROCEDURE — ZZZZZ: CPT

## 2024-03-25 PROCEDURE — 77081 DXA BONE DENSITY APPENDICULR: CPT | Mod: GA

## 2024-03-25 PROCEDURE — G2211 COMPLEX E/M VISIT ADD ON: CPT

## 2024-03-25 PROCEDURE — 99214 OFFICE O/P EST MOD 30 MIN: CPT

## 2024-03-25 RX ORDER — RISEDRONATE SODIUM 35 MG/1
35 TABLET, DELAYED RELEASE ORAL
Qty: 3 | Refills: 3 | Status: ACTIVE | COMMUNITY
Start: 2023-03-03 | End: 1900-01-01

## 2024-03-25 RX ORDER — RALOXIFENE HYDROCHLORIDE 60 MG/1
60 TABLET, FILM COATED ORAL
Qty: 90 | Refills: 3 | Status: ACTIVE | COMMUNITY
Start: 2020-08-07 | End: 1900-01-01

## 2024-03-25 NOTE — PROCEDURE
[FreeTextEntry1] : BMD 03/25/2024 compared to 2023 Total Hip: -2.7 osteopenia, -4.4% Spine:L1-2, -2.6, osteoporosis, -4.2% minor change Femoral Neck: -2.9, osteoporosis, ns Forearm: --1.6, osteopenia, ns  Bone mineral density: 03/3/2023  Indication: vs. 2022 Spine: (L1-L2) -2.4, stable Total hip: -2.5 osteoporosis, no change Femoral neck: -2.9 osteoporosis, no significant change  Proximal radius: -1.4 osteopenia, no significant change   Bone mineral density: 02/22/2022  Indication: vs. 2021 Spine: (L1-L2) -2.5 osteoporosis, -3.2% Total hip: -2.5 osteoporosis, -6.6% Femoral neck: -2.7 osteoporosis, no significant change Proximal radius: -1.1 osteopenia, no significant change  Bone mineral density: 02/19/2021  Indication: vs. 2020 assess response to medication Spine: (L1-L2) -2.3 osteopenia, no significant change Total hip: -2.2 osteopenia, +7.2% Femoral neck: -2.6 osteoporosis, +5.0% Proximal radius: -0.9 normal, +5.8%  Bone mineral density: 01/14/2020  Indication: vs 2018. Spine: (L1 - L2) -2.2, osteopenia, -3.3% Total hip: -2.5, osteoporosis, -5.8% Femoral neck: -2.9, osteoporosis, no significant change  Proximal radius: -1.5, osteopenia, -7.5%  Bone mineral density: 11/28/2018  Indication: vs. 2017 prior test showed bone loss, assess response to medication  Spine: (L1-L2) -2.0 osteopenia, no significant change  Total hip: -2.2 osteopenia, no significant change  Femoral neck: -2.9 osteoporosis (-6.1%, but troch stable) Proximal radius: -0.6 normal, no significant change   Bone mineral density November 4, 2017 indication:Compared to 2013 spine L1-2 -2.0 osteopenia -3.3% versus 2013 -10.3% versus 2010 total hip -2.2, osteopenia, -4.0%, -10.9% versus 2010 femoral neck - 2.6, osteoporosis, -5.1%; -8.6% versus 2010 proximal radius -1.2   osteopenia   bone mineral density test performed December 26, 2013 Spine L1-L2 -1.8, osteopenia -7.2% versus 2010 total hip -2.0, osteopenia, -7.3% versus 2010 Femoral neck -2.3, osteopenia, no significant change versus 2010 proximal radius -1.0, normal

## 2024-03-25 NOTE — ASSESSMENT
[Raloxifene Therapy] : Risks and benefits of Raloxifene therapy were discussed with the patient including blood clots, hot flashes, and cramps [FreeTextEntry1] : 68 year-old female with postmenopausal osteoporosis.   Pt has previously suffered traumatic R wrist and tibia fx. Pt had decreasing BMD from 2010 to 2013 only c/w osteopenia, but then further decrease in BMD 11/2017 making the fem neck c/w osteoporosis. Pt was advised that she is at increased risk for future fx due to osteoporosis. Pt began Actonel in 2017. Took correctly, pt had mild UGI sx on Actonel. No interval fx. Switched to IV Reclast ~7/2018. She tolerated well, except for minor localized rash on her L breast for only 3 days. BMD 11/2018 indicates decrease in the fem neck but stability in all other sites. BMD 1/2020 show sl decrease in all areas except fem neck. Tot hip and fem neck c/w osteoporosis. Switched to Prolia 1/2020. Did not tolerate, had nonspecific pruritus. Pt transitioned to Evista 8/2020 to rx osteoporosis and primary prevention of breast CA. Taking correctly, tolerating well. Occasional hot flashes, no leg cramps, no DVT. No interval fx. BMD 2/2021 indicated stable osteopenia in spine, significantly improving osteopenia in total hip, improving osteoporosis in femoral neck, and normal improving proximal radius. BMD 2/2022 indicated worsened now osteoporosis in spine and total hip, stable osteoporosis in femoral neck, and stable osteopenia in proximal radius, essentially normal. Given that her lowest site, the femoral neck, was stable on DEXA, it was recommended that patient c/w Evista, and reevaluate options in 1 year. 3/2023 BMD: stable in spine and total hip, Femoral neck osteoporosis and Proximal radius osteopenia. Discussed options of changing osteoporosis treatment given slight worsening in femoral neck BMD, patient with previous side effects/intolerance to other osteoporosis medications. Discussed Atelvia enteric coated once a week with food (should tolerate more than Fosamax and Actonel) vs Evenity (not a candidate for forteo or tymlos given radiation hx) vs continuing evista. BMD not severe enough to push for Evenity. Would recommend patient to start Atelvia. Patient elected to start Risedronate delayed release. Patient stopped taking Risedronate for dental work 4 months before and after treatment. Reported GERD after starting again and stopped taking medication until 01/2024 and now tolerating well.  BMD 03/2024 shows stable osteoporosis in the L1-2 spine and fem neck and stable osteopenia in the total hip and forearm.  I recommend staying on Raloxifene for breast CA prevention and Risedronate delayed release if tolerating well.  Will obtain recent labs from PCP: Irene Carranza MD, [phone]   F/u in 1 year w/ BMD

## 2024-03-25 NOTE — END OF VISIT
[FreeTextEntry3] :  I, Marysol Fisher, authored this note working as a medical scribe for Dr. Patiño.  03/25/2024 .  This note was authored by the medical scribe for me. I have reviewed, edited, and revised the note as needed. I am in agreement with the exam findings, imaging findings, and treatment plan.  Raz Patiño MD

## 2024-03-25 NOTE — PHYSICAL EXAM
[Alert] : alert [Well Nourished] : well nourished [No Acute Distress] : no acute distress [Well Developed] : well developed [Normal Sclera/Conjunctiva] : normal sclera/conjunctiva [No Proptosis] : no proptosis [Supple] : the neck was supple [Thyroid Not Enlarged] : the thyroid was not enlarged [No Thyroid Nodules] : no palpable thyroid nodules [No Accessory Muscle Use] : no accessory muscle use [Normal Rate and Effort] : normal respiratory rate and effort [Clear to Auscultation] : lungs were clear to auscultation bilaterally [Normal S1, S2] : normal S1 and S2 [Normal Rate] : heart rate was normal [Regular Rhythm] : with a regular rhythm [No Edema] : no peripheral edema [Not Tender] : non-tender [Not Distended] : not distended [Soft] : abdomen soft [Normal Anterior Cervical Nodes] : no anterior cervical lymphadenopathy [No Spinal Tenderness] : no spinal tenderness [Spine Straight] : spine straight [No Stigmata of Cushings Syndrome] : no stigmata of Cushings Syndrome [Normal Gait] : normal gait [No Tremors] : no tremors [Oriented x3] : oriented to person, place, and time [Normal Affect] : the affect was normal

## 2024-03-25 NOTE — HISTORY OF PRESENT ILLNESS
[FreeTextEntry1] : 68 year old female here for a follow up with osteoporosis.  Patient had stopped Risedronate delayed release for dental work. She had stopped 4 months before and after treatment, but when she went to started again, she reported GERD. 10/2023 she changed her diet and in 01/2024 she started Risedronate delayed release again. She is still taking Raloxifene, tolerating well. She reports having hot flashes.  H/o traumatic tibial plateau fx; wrist fx, fell over dog. Pt had low bone density after menopause consistent with osteopenia which decreased from 2010 to 2013 but she chose to observe off therapy. BMD 11/2017 showed significant decrease in the spine and hip making the fem neck consistent with osteoporosis. Began Actonel 11/2017 pt had some mild UGI sx. Switched to IV Reclast, first dose ~7/2018. Tolerated well, except for minor breast rash for 3 days. BMD 11/2018 indicates decrease in the fem neck but stability in all other sites. Switched to Prolia 1/2020. Did not tolerate, had nonspecific pruritus. Pt transitioned to Evista 8/2020. Occasional hot flashes, no leg cramps, no DVT. No interval fx. Up to date with mammography and GYN. BMD 2/2021 indicated stable osteopenia in spine, significantly improving osteopenia in total hip, improving osteoporosis in femoral neck, and normal improving proximal radius. At last visit, BMD 2/2022 indicated worsened now osteoporosis in spine and total hip, stable osteoporosis in femoral neck, and stable osteopenia in proximal radius, essentially normal. Given that her lowest site, the femoral neck, was stable on DEXA, it was recommended that patient c/w Evista, and reevaluate options in 1 year.  BMD 03/2024 shows stable osteoporosis in the L1-2 spine and fem neck and stable osteopenia in the total hip and forearm.  Vitamin D 2000 IU daily, takes multivitamin that has calcium. Was previously on oral calcium but had elevated serum calcium levels and was told to stop oral calcium supplementation.   PMHx Has hx of left breast cancer (DCIS) 2002 s/p radiation, not currently following oncology. Successful ankle bone spur removal and left bunionectomy 6/2021, had left metatarsal fx post op, no surgical repair.   FHx idiopathic osteoporosis in her son. Her daughter dx with papillary thyroid CA. Breast CA in sister.

## 2024-03-26 PROBLEM — M81.0 OSTEOPOROSIS: Status: ACTIVE | Noted: 2017-11-02

## 2024-03-26 PROBLEM — Z91.89 INCREASED RISK OF BREAST CANCER: Status: ACTIVE | Noted: 2024-03-26

## 2024-07-08 ENCOUNTER — TRANSCRIPTION ENCOUNTER (OUTPATIENT)
Age: 69
End: 2024-07-08

## 2024-08-21 ENCOUNTER — APPOINTMENT (OUTPATIENT)
Dept: COLORECTAL SURGERY | Facility: CLINIC | Age: 69
End: 2024-08-21
Payer: MEDICARE

## 2024-08-21 VITALS
HEIGHT: 63 IN | RESPIRATION RATE: 14 BRPM | OXYGEN SATURATION: 98 % | WEIGHT: 118 LBS | SYSTOLIC BLOOD PRESSURE: 128 MMHG | DIASTOLIC BLOOD PRESSURE: 81 MMHG | HEART RATE: 67 BPM | BODY MASS INDEX: 20.91 KG/M2

## 2024-08-21 PROCEDURE — 99203 OFFICE O/P NEW LOW 30 MIN: CPT

## 2024-08-23 NOTE — REVIEW OF SYSTEMS
[Negative] : Heme/Lymph [FreeTextEntry5] : HTN, elevated cholesterol [FreeTextEntry7] : Megha MORGAN, Hx diverticulitis [FreeTextEntry8] : Hysterectomy [FreeTextEntry1] : Takes Gabapentin for Hot Flashes

## 2024-08-23 NOTE — PHYSICAL EXAM
[Normal Breath Sounds] : Normal breath sounds [Normal Heart Sounds] : normal heart sounds [Normal Rate and Rhythm] : normal rate and rhythm [No Rash or Lesion] : No rash or lesion [Alert] : alert [Oriented to Person] : oriented to person [Oriented to Place] : oriented to place [Oriented to Time] : oriented to time [Calm] : calm [de-identified] : round, NT/ND [de-identified] : well nourished female [de-identified] : NC/AT [de-identified] : NORMAN/+ROM [de-identified] : Intact

## 2024-08-23 NOTE — HISTORY OF PRESENT ILLNESS
[FreeTextEntry1] : Patient is a 70 yo WF here for pre-visit prior to colonoscopy.  Last study was done in 2019 (Dr. Palencia) and she states random biopsies were taken and diverticulosis was seen.  Her primary Dr. is Dr. Leiva.  She has a history of many diverticulitis attacks but only 1 CT ever.  She states that since changing to lifestyle medicine she has been good.  She is having daily bowel movement and denies any bleeding.

## 2024-08-23 NOTE — PHYSICAL EXAM
[Normal Breath Sounds] : Normal breath sounds [Normal Heart Sounds] : normal heart sounds [Normal Rate and Rhythm] : normal rate and rhythm [No Rash or Lesion] : No rash or lesion [Alert] : alert [Oriented to Person] : oriented to person [Oriented to Place] : oriented to place [Oriented to Time] : oriented to time [Calm] : calm [de-identified] : round, NT/ND [de-identified] : well nourished female [de-identified] : NC/AT [de-identified] : NORMAN/+ROM [de-identified] : Intact

## 2024-08-23 NOTE — HISTORY OF PRESENT ILLNESS
[FreeTextEntry1] : Patient is a 68 yo WF here for pre-visit prior to colonoscopy.  Last study was done in 2019 (Dr. Palencia) and she states random biopsies were taken and diverticulosis was seen.  Her primary Dr. is Dr. Leiva.  She has a history of many diverticulitis attacks but only 1 CT ever.  She states that since changing to lifestyle medicine she has been good.  She is having daily bowel movement and denies any bleeding.

## 2024-09-16 ENCOUNTER — APPOINTMENT (OUTPATIENT)
Dept: COLORECTAL SURGERY | Facility: CLINIC | Age: 69
End: 2024-09-16
Payer: MEDICARE

## 2024-09-16 PROCEDURE — 45378 DIAGNOSTIC COLONOSCOPY: CPT

## 2024-09-25 ENCOUNTER — APPOINTMENT (OUTPATIENT)
Dept: NEUROLOGY | Facility: CLINIC | Age: 69
End: 2024-09-25
Payer: MEDICARE

## 2024-09-25 VITALS
BODY MASS INDEX: 20.91 KG/M2 | SYSTOLIC BLOOD PRESSURE: 123 MMHG | HEIGHT: 63 IN | WEIGHT: 118 LBS | HEART RATE: 74 BPM | DIASTOLIC BLOOD PRESSURE: 82 MMHG

## 2024-09-25 DIAGNOSIS — G25.0 ESSENTIAL TREMOR: ICD-10-CM

## 2024-09-25 PROCEDURE — 99204 OFFICE O/P NEW MOD 45 MIN: CPT

## 2024-09-25 NOTE — PHYSICAL EXAM
[FreeTextEntry1] : Constitutional:  Patient was well-developed, well-nourished and in no acute distress.   Head:  Normocephalic, atraumatic. Tympanic membranes were clear.   Neck:  Supple with full range of motion.   Cardiovascular:  Cardiac rhythm was regular without murmur. There were no carotid bruits. Peripheral pulses were full and symmetric.   Respiratory:  Lungs were clear.   Abdomen:  Soft and nontender.   Spine:  Nontender.   Skin:  There were no rashes.   NEUROLOGICAL EXAMINATION:  Mental Status: Patient was alert and oriented. Speech was fluent. There was no dysarthria.  Spiral drawing was tremulous.  She was not micrographic.  Cranial Nerves:   II: She could finger count bilaterally.  The right pupil was surgical and both were reactive. Visual fields were full. Funduscopic examination was normal.   III, IV, VI:  Eye movements were full without nystagmus.   V: Facial sensation was intact.   VII: Facial strength was normal.  There was a prominent lower lip tremor.  VIII: Hearing was equal.   IX, X: Palatal movement was normal. Phonation was normal.   XI: Sternocleidomastoids and trapezii were normal.   XII: Tongue was midline and movements normal. There was no lingual atrophy or fasciculations.   Motor Examination: Muscle bulk, tone and strength were normal.  Fine finger and rapid alternating movements were only mildly slow.  There was a very fine postural tremor in her outstretched hands.  Sensory Examination: Pinprick, vibration and joint position sense were intact.   Reflexes: DTRs were 2+ throughout.   Plantar Responses: Plantar responses were flexor.   Coordination/Cerebellar Function: There was no dysmetria on finger to nose or heel to shin testing.   Gait/Stance: Gait and tandem were normal. Romberg was negative.

## 2024-09-25 NOTE — CONSULT LETTER
[Dear  ___] : Dear  [unfilled], [Consult Letter:] : I had the pleasure of evaluating your patient, [unfilled]. [Please see my note below.] : Please see my note below. [Consult Closing:] : Thank you very much for allowing me to participate in the care of this patient.  If you have any questions, please do not hesitate to contact me. [Sincerely,] : Sincerely, [FreeTextEntry3] : Andrew Stanford M.D.

## 2024-09-25 NOTE — ASSESSMENT
[FreeTextEntry1] : Mrs. 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  Her father and son also suffer from tremor.  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  There is no evidence of Parkinson's disease.  I suggested that she undergo an MRI of the brain with and without contrast, and a serologic evaluation including a [facility] comprehensive ataxia panel.  I provided her written information regarding treatment of essential and familial tremor.  Further management will depend upon the above results and her clinical course.

## 2024-09-25 NOTE — HISTORY OF PRESENT ILLNESS
[FreeTextEntry1] : Mrs. Alka Van is a 69-year-old left-handed patient who was referred for neurologic evaluation at your kind suggestion.  Mrs. Van was well until 10 years ago when she noticed the onset of spontaneous right leg tremors while standing.  Her tremor was aggravated by anxiety and cold temperature.  She subsequently noted tremor of her left thumb, difficulty writing and more recently a tremor of her lower lip.  The latter caused her difficulty drinking from a glass.  Her tremor worsens when anxious and when she drinks excessive caffeine.  It improves when she drinks alcohol.  She denies vocal tremor, cognitive, visual, hearing, swallowing, sensory, gait or sphincteric difficulties.  She recalls that her father suffered from tremor.  Her son believes that he has a tremor as well.  She has 6 sisters and 3 brothers.  One sister suffers from breast cancer.  One brother suffered an accidental death and another  of ALS.  Past surgical history is notable for a left lumpectomy and partial mastectomy for breast cancer in  followed by reconstruction 10 years later.  She was treated with radiation, tamoxifen and more recently raloxifene.  She is status post prophylactic BRIDGER/BSO, right cataract extraction, tonsillectomy, bunionectomy and right wrist ORIF.  She suffers of hypertension, hyperlipidemia and breast cancer.  There is no history of diabetes, cardiac, pulmonary, renal, hepatic, gastrointestinal, thyroid, hematologic or cerebrovascular disease.  She has allergies to codeine and bacitracin.  Medications include losartan 25 mg daily, HCTZ 25 mg daily, atorvastatin 10 mg daily, raloxifene, risedronate, gabapentin 600 mg at bedtime and vitamins.  She is a non-smoker and social drinker.  She is  and is a retired RN and executive.  Family history is notable for a mother with ovarian and fallopian cancer, a father with renal carcinoma and tremor.

## 2024-10-03 ENCOUNTER — APPOINTMENT (OUTPATIENT)
Dept: MRI IMAGING | Facility: CLINIC | Age: 69
End: 2024-10-03
Payer: MEDICARE

## 2024-10-03 ENCOUNTER — OUTPATIENT (OUTPATIENT)
Dept: OUTPATIENT SERVICES | Facility: HOSPITAL | Age: 69
LOS: 1 days | End: 2024-10-03
Payer: MEDICARE

## 2024-10-03 DIAGNOSIS — G25.0 ESSENTIAL TREMOR: ICD-10-CM

## 2024-10-03 PROCEDURE — 70553 MRI BRAIN STEM W/O & W/DYE: CPT | Mod: 26,MH

## 2024-10-14 ENCOUNTER — LABORATORY RESULT (OUTPATIENT)
Age: 69
End: 2024-10-14

## 2024-11-20 PROCEDURE — 70553 MRI BRAIN STEM W/O & W/DYE: CPT

## 2024-11-20 PROCEDURE — A9585: CPT

## 2024-11-27 ENCOUNTER — TRANSCRIPTION ENCOUNTER (OUTPATIENT)
Age: 69
End: 2024-11-27

## 2024-11-29 RX ORDER — PROPRANOLOL HYDROCHLORIDE 10 MG/1
10 TABLET ORAL
Qty: 180 | Refills: 0 | Status: ACTIVE | COMMUNITY
Start: 2024-11-29 | End: 1900-01-01

## 2025-03-12 ENCOUNTER — RX RENEWAL (OUTPATIENT)
Age: 70
End: 2025-03-12

## 2025-03-28 ENCOUNTER — APPOINTMENT (OUTPATIENT)
Dept: ENDOCRINOLOGY | Facility: CLINIC | Age: 70
End: 2025-03-28
Payer: MEDICARE

## 2025-03-28 VITALS
HEART RATE: 74 BPM | WEIGHT: 119 LBS | DIASTOLIC BLOOD PRESSURE: 80 MMHG | HEIGHT: 62.8 IN | OXYGEN SATURATION: 98 % | BODY MASS INDEX: 21.09 KG/M2 | SYSTOLIC BLOOD PRESSURE: 124 MMHG

## 2025-03-28 DIAGNOSIS — M81.0 AGE-RELATED OSTEOPOROSIS W/OUT CURRENT PATHOLOGICAL FRACTURE: ICD-10-CM

## 2025-03-28 DIAGNOSIS — Z91.89 OTHER SPECIFIED PERSONAL RISK FACTORS, NOT ELSEWHERE CLASSIFIED: ICD-10-CM

## 2025-03-28 PROCEDURE — G2211 COMPLEX E/M VISIT ADD ON: CPT

## 2025-03-28 PROCEDURE — 99214 OFFICE O/P EST MOD 30 MIN: CPT

## 2025-03-28 PROCEDURE — 77080 DXA BONE DENSITY AXIAL: CPT | Mod: GA

## 2025-03-31 LAB
25(OH)D3 SERPL-MCNC: 54.3 NG/ML
ALBUMIN SERPL ELPH-MCNC: 4.8 G/DL
ALP BLD-CCNC: 68 U/L
ALT SERPL-CCNC: 18 U/L
ANION GAP SERPL CALC-SCNC: 14 MMOL/L
AST SERPL-CCNC: 21 U/L
BILIRUB SERPL-MCNC: 0.4 MG/DL
BUN SERPL-MCNC: 11 MG/DL
CALCIUM SERPL-MCNC: 10.4 MG/DL
CALCIUM SERPL-MCNC: 10.4 MG/DL
CHLORIDE SERPL-SCNC: 101 MMOL/L
CO2 SERPL-SCNC: 28 MMOL/L
CREAT SERPL-MCNC: 0.73 MG/DL
EGFRCR SERPLBLD CKD-EPI 2021: 90 ML/MIN/1.73M2
GLUCOSE SERPL-MCNC: 84 MG/DL
PARATHYROID HORMONE INTACT: 16 PG/ML
PHOSPHATE SERPL-MCNC: 4.1 MG/DL
POTASSIUM SERPL-SCNC: 4.5 MMOL/L
PROT SERPL-MCNC: 7.4 G/DL
SODIUM SERPL-SCNC: 143 MMOL/L

## 2025-08-22 ENCOUNTER — TRANSCRIPTION ENCOUNTER (OUTPATIENT)
Age: 70
End: 2025-08-22

## 2025-09-08 ENCOUNTER — RX RENEWAL (OUTPATIENT)
Age: 70
End: 2025-09-08